# Patient Record
Sex: FEMALE | Race: WHITE | Employment: UNEMPLOYED | URBAN - METROPOLITAN AREA
[De-identification: names, ages, dates, MRNs, and addresses within clinical notes are randomized per-mention and may not be internally consistent; named-entity substitution may affect disease eponyms.]

---

## 2018-03-21 ENCOUNTER — OFFICE VISIT (OUTPATIENT)
Dept: NEUROLOGY | Age: 38
End: 2018-03-21

## 2018-03-21 VITALS
HEIGHT: 62 IN | SYSTOLIC BLOOD PRESSURE: 110 MMHG | BODY MASS INDEX: 36.99 KG/M2 | DIASTOLIC BLOOD PRESSURE: 84 MMHG | WEIGHT: 201 LBS

## 2018-03-21 DIAGNOSIS — R41.3 MEMORY DIFFICULTY: ICD-10-CM

## 2018-03-21 DIAGNOSIS — G43.719 INTRACTABLE CHRONIC MIGRAINE WITHOUT AURA AND WITHOUT STATUS MIGRAINOSUS: ICD-10-CM

## 2018-03-21 DIAGNOSIS — G44.40 MEDICATION OVERUSE HEADACHE: Primary | ICD-10-CM

## 2018-03-21 DIAGNOSIS — G44.221 CHRONIC TENSION-TYPE HEADACHE, INTRACTABLE: ICD-10-CM

## 2018-03-21 RX ORDER — BUPRENORPHINE AND NALOXONE 8; 2 MG/1; MG/1
FILM, SOLUBLE BUCCAL; SUBLINGUAL DAILY
COMMUNITY

## 2018-03-21 RX ORDER — TOPIRAMATE 50 MG/1
TABLET, FILM COATED ORAL
Qty: 60 TAB | Refills: 1 | Status: SHIPPED | OUTPATIENT
Start: 2018-03-21

## 2018-03-21 RX ORDER — SUMATRIPTAN 100 MG/1
TABLET, FILM COATED ORAL
Qty: 9 TAB | Refills: 1 | Status: SHIPPED | OUTPATIENT
Start: 2018-03-21

## 2018-03-21 RX ORDER — PREDNISONE 10 MG/1
TABLET ORAL
Qty: 24 TAB | Refills: 0 | Status: SHIPPED | OUTPATIENT
Start: 2018-03-21 | End: 2022-10-12

## 2018-03-21 RX ORDER — DULOXETIN HYDROCHLORIDE 30 MG/1
30 CAPSULE, DELAYED RELEASE ORAL DAILY
COMMUNITY
End: 2022-10-12

## 2018-03-21 RX ORDER — DEXTROAMPHETAMINE SACCHARATE, AMPHETAMINE ASPARTATE, DEXTROAMPHETAMINE SULFATE AND AMPHETAMINE SULFATE 5; 5; 5; 5 MG/1; MG/1; MG/1; MG/1
20 TABLET ORAL
COMMUNITY

## 2018-03-21 RX ORDER — BUPROPION HYDROCHLORIDE 150 MG/1
TABLET, EXTENDED RELEASE ORAL 2 TIMES DAILY
COMMUNITY
End: 2022-10-12

## 2018-03-21 NOTE — PROGRESS NOTES
Patient is here for memory loss and migraines. Memory loss started approximately 3 months ago. Migraines started after she had her daughter 12/2014, She saw Dr. Macho Obrien while at [facility]. He did a CT scan and told her she had air in her frontal lobe. Patient is accompanied by her mother and her sister.

## 2018-03-21 NOTE — PATIENT INSTRUCTIONS
[facility]   Statement to Patients  April 1, 2014      In an effort to ensure the large volume of patient prescription refills is processed in the most efficient and expeditious manner, we are asking our patients to assist us by calling your Pharmacy for all prescription refills, this will include also your  Mail Order Pharmacy. The pharmacy will contact our office electronically to continue the refill process. Please do not wait until the last minute to call your pharmacy. We need at least 48 hours (2days) to fill prescriptions. We also encourage you to call your pharmacy before going to  your prescription to make sure it is ready. With regard to controlled substance prescription refill requests (narcotic refills) that need to be picked up at our office, we ask your cooperation by providing us with at least 72 hours (3days) notice that you will need a refill. We will not refill narcotic prescription refill requests after 4:00pm on any weekday, Monday through Thursday, or after 2:00pm on Fridays, or on the weekends. We encourage everyone to explore another way of getting your prescription refill request processed using [employer], our patient web portal through our electronic medical record system. [employer] is an efficient and effective way to communicate your medication request directly to the office and  downloadable as an eliza on your smart phone . [employer] also features a review functionality that allows you to view your medication list as well as leave messages for your physician. Are you ready to get connected? If so please review the attatched instructions or speak to any of our staff to get you set up right away! Thank you so much for your cooperation. Should you have any questions please contact our Practice Administrator.     The Physicians and Staff,  [facility] ([place]

## 2018-03-21 NOTE — MR AVS SNAPSHOT
[facility] 
 
 
 [place] [address] [facility] [place] [address] [place] [address] [phone] Patient: Kat Estrella MRN: [id number] Visit Information Date & Time Provider Department Dept. Phone Encounter #  
 3/21/2018 11:00 AM Joel Seen, MD [facility] [phone] [id number] Your Appointments 4/20/2018 10:00 AM  
Follow Up with Joel Seen, MD  
[facility]) Appt Note: follow up MRI results [facility] [place] [address] [facility] [place] [address] [place] [address] [phone]  
  
   
 [place] [address] [id number] [address] Upcoming Health Maintenance Date Due DTaP/Tdap/Td series (1 - Tdap) 6/9/2001 PAP AKA CERVICAL CYTOLOGY 6/9/2001 Influenza Age 5 to Adult 8/1/2017 Allergies as of 3/21/2018  Review Complete On: 3/21/2018 By: Mickey Webster Severity Noted Reaction Type Reactions Toradol [Ketorolac]  03/21/2018    Rash Current Immunizations  Never Reviewed No immunizations on file. Not reviewed this visit You Were Diagnosed With   
  
 Codes Comments Medication overuse headache    -  Primary ICD-10-CM: G44.40 ICD-9-CM: 339. 3 Chronic tension-type headache, intractable     ICD-10-CM: A95.939 ICD-9-CM: 339.12 Intractable chronic migraine without aura and without status migrainosus     ICD-10-CM: B21.908 ICD-9-CM: 346.71 Memory difficulty     ICD-10-CM: R41.3 ICD-9-CM: 780.93 Vitals BP Height(growth percentile) Weight(growth percentile) BMI Smoking Status 110/84 5' 2\" (1.575 m) 201 lb (91.2 kg) 36.76 kg/m2 Current Every Day Smoker BMI and BSA Data Body Mass Index Body Surface Area  
 36.76 kg/m 2 2 m 2 Preferred Pharmacy Pharmacy Name Phone  [id number] [address]  [place] [facility] [phone] Your Updated Medication List  
  
   
This list is accurate as of 3/21/18 11:56 AM.  Always use your most recent med list.  
  
  
  
  
 ADDERALL 20 mg tablet Generic drug:  dextroamphetamine-amphetamine Take 20 mg by mouth. CYMBALTA 30 mg capsule Generic drug:  DULoxetine Take 30 mg by mouth daily. predniSONE 10 mg tablet Commonly known as:  Lowell Rinks Take in AM with food. Day 1: 6 tabs  Day 2: 6 tabs   Day 3: 5 tabs   Day 4: 4 tabs   Day 5: 3 tabs  Day 6: 1 tab. Then stop SUBOXONE 8-2 mg Film sublingaul film Generic drug:  buprenorphine-naloxone  
by SubLINGual route daily. SUMAtriptan 100 mg tablet Commonly known as:  IMITREX  
1 tab at onset of migraine. Repeat 1 tab in 2 hours if headache remains. Limit: max 2 days a week. topiramate 50 mg tablet Commonly known as:  TOPAMAX Take 1 tab twice a day for migraine prevention WELLBUTRIN  mg SR tablet Generic drug:  buPROPion SR Take  by mouth two (2) times a day. Prescriptions Sent to Pharmacy Refills  
 topiramate (TOPAMAX) 50 mg tablet 1 Sig: Take 1 tab twice a day for migraine prevention Class: Normal  
 Pharmacy: [facility]/pharmacy [address] Ph #: [phone] SUMAtriptan (IMITREX) 100 mg tablet 1 Si tab at onset of migraine. Repeat 1 tab in 2 hours if headache remains. Limit: max 2 days a week. Class: Normal  
 Pharmacy: [facility]/pharmacy #[id number] Ph #: [phone]  
 predniSONE (DELTASONE) 10 mg tablet 0 Sig: Take in AM with food. Day 1: 6 tabs  Day 2: 6 tabs   Day 3: 5 tabs   Day 4: 4 tabs   Day 5: 3 tabs  Day 6: 1 tab. Then stop  Class: Normal  
 Pharmacy: [address] [place] [address] [address] [facility] #: [phone] To-Do List   
 03/21/2018 Imaging:  MRI BRAIN W WO CONT Patient Instructions PRESCRIPTION REFILL POLICY [facility] Statement to Patients April 1, 2014 In an effort to ensure the large volume of patient prescription refills is processed in the most efficient and expeditious manner, we are asking our patients to assist us by calling your Pharmacy for all prescription refills, this will include also your  Mail Order Pharmacy. The pharmacy will contact our office electronically to continue the refill process. Please do not wait until the last minute to call your pharmacy. We need at least 48 hours (2days) to fill prescriptions. We also encourage you to call your pharmacy before going to  your prescription to make sure it is ready. With regard to controlled substance prescription refill requests (narcotic refills) that need to be picked up at our office, we ask your cooperation by providing us with at least 72 hours (3days) notice that you will need a refill. We will not refill narcotic prescription refill requests after 4:00pm on any weekday, Monday through Thursday, or after 2:00pm on Fridays, or on the weekends. We encourage everyone to explore another way of getting your prescription refill request processed using [employer], our patient web portal through our electronic medical record system. [employer] is an efficient and effective way to communicate your medication request directly to the office and  downloadable as an eliza on your smart phone . [employer] also features a review functionality that allows you to view your medication list as well as leave messages for your physician. Are you ready to get connected? If so please review the attatched instructions or speak to any of our staff to get you set up right away! Thank you so much for your cooperation.  Should you have any questions please contact our Practice Administrator. The Physicians and Staff,  [facility] Introducing [facility]! [facility] introduces [employer] patient portal. Now you can access parts of your medical record, email your doctor's office, and request medication refills online. 1. In your internet browser, go to [email or web address] 2. Click on the First Time User? Click Here link in the Sign In box. You will see the New Member Sign Up page. 3. Enter your Cogo Access Code exactly as it appears below. You will not need to use this code after youve completed the sign-up process. If you do not sign up before the expiration date, you must request a new code. · Cogo Access Code: [email or web address] Expires: 6/19/2018 11:56 AM 
 
4. Enter the last four digits of your Social Security Number (xxxx) and Date of Birth (mm/dd/yyyy) as indicated and click Submit. You will be taken to the next sign-up page. 5. Create a Cogo ID. This will be your Cogo login ID and cannot be changed, so think of one that is secure and easy to remember. 6. Create a Cogo password. You can change your password at any time. 7. Enter your Password Reset Question and Answer. This can be used at a later time if you forget your password. 8. Enter your e-mail address. You will receive e-mail notification when new information is available in [address] 9. Click Sign Up. You can now view and download portions of your medical record. 10. Click the Download Summary menu link to download a portable copy of your medical information. If you have questions, please visit the Frequently Asked Questions section of the Cogo website. Remember, Cogo is NOT to be used for urgent needs. For medical emergencies, dial 911. Now available from your iPhone and Android! Please provide this summary of care documentation to your next provider. Your primary care clinician is listed as KASSIDY TAVERAS. If you have any questions after today's visit, please call [phone].

## 2018-03-21 NOTE — PROGRESS NOTES
Name: Dorothy Gottlieb      :  1980    PCP:   Bouchra Albarran MD      Referring:  Self  MRN:   [id number]    Chief Complaint:   Chief Complaint   Patient presents with    Headache    Memory Loss       HISTORY OF PRESENT ILLNESS:     This is a 40 y.o. female depression, anxiety, substance addiction (on Suboxone) who presents for evaluation of headaches and memory issues. She's accompanied by sister and mother who helps with history. Started to have episodic migraine around 25years old. Describes migraine as: whole top of head, throbbing, feels like head might explode, + nausea, no vomiting, + light and sound sensitivity. Vision gets blurry before and during headache. Takes either Advil, BC powder, Tylenol, or Aleve every day to help reduce prevent headache. Up until 4 months ago, she reports having migraine 0-1 days a month. Reports having increased stressors since 2017 which roughly fits the 4-5 month timeline when headaches increased. Never been on a daily headache preventive. Currently on Wellbutrin, Cymbalta, and Adderall for mood and attention. She reports that in Dec 2014 had an epidural for childbirth, had a spinal leak, underwent epidural blood patch. Was seen by Neurology at the time (Dr Manuel Farrell), noted to have some air in frontal area, suspected to be from the epidural or blood patch. Pt says was told that it would absorb on its own. # of headache days a week: 7  # of migraine days a week: 6-7    Separate complaint is of memory difficulty. She says can't remember her age at times, losing stuff frequently, forgetting kids birthdays. She and family report that her depression and anxiety is \"extremely severe,\" worsening over past few months.         Complete Review of Systems: + anxiety, depression, fatigue, headaches, hearing loss, joint pain, leg swelling, memory loss, muscle pain, muscle weakness, nausea, decreased appetite, tinnitus, dyspnea, skipped beats, vertigo, visual disturbance, weight changes; Allergies   Allergen Reactions    Toradol [Ketorolac] Rash     Past Medical History:   Diagnosis Date    Anxiety     Arrhythmia     Depression     Headache      Current Outpatient Prescriptions   Medication Sig Dispense Refill    buPROPion SR (WELLBUTRIN SR) 150 mg SR tablet Take  by mouth two (2) times a day.  DULoxetine (CYMBALTA) 30 mg capsule Take 30 mg by mouth daily.  dextroamphetamine-amphetamine (ADDERALL) 20 mg tablet Take 20 mg by mouth.  buprenorphine-naloxone (SUBOXONE) 8-2 mg film sublingaul film by SubLINGual route daily.  topiramate (TOPAMAX) 50 mg tablet Take 1 tab twice a day for migraine prevention 60 Tab 1    SUMAtriptan (IMITREX) 100 mg tablet 1 tab at onset of migraine. Repeat 1 tab in 2 hours if headache remains. Limit: max 2 days a week. 9 Tab 1    predniSONE (DELTASONE) 10 mg tablet Take in AM with food. Day 1: 6 tabs  Day 2: 6 tabs   Day 3: 5 tabs   Day 4: 4 tabs   Day 5: 3 tabs  Day 6: 1 tab. Then stop 24 Tab 0     No past surgical history on file. Family History   Problem Relation Age of Onset    Heart Disease Mother     Heart Disease Father     Headache Sister      Social History     Social History    Marital status:      Spouse name: N/A    Number of children: N/A    Years of education: N/A     Occupational History    Not on file. Social History Main Topics    Smoking status: Current Every Day Smoker     Packs/day: 1.00     Years: 5.00    Smokeless tobacco: Never Used    Alcohol use No    Drug use: Not on file    Sexual activity: Not on file     Other Topics Concern    Not on file     Social History Narrative    No narrative on file       PHYSICAL EXAM  Vitals:    03/21/18 1109   BP: 110/84   Weight: 91.2 kg (201 lb)   Height: 5' 2\" (1.575 m)       General:  Alert, cooperative, NAD   Head:  Normocephalic, atraumatic.    Eyes:  Conjunctivae/corneas clear   Lungs:  Heart:  Non labored breathing  Regular rate, rhythm   Extremities: No edema. Skin: No rashes    Neurologic Exam       Language: normal  Memory:  Alert, oriented to person, place, situation    Cranial Nerves:  I: smell Not tested   II: visual fields Full to confrontation   II: pupils Equal, round, reactive to light   II: optic disc No papilledema   III,VII: ptosis none   III,IV,VI: extraocular muscles  normal   V: facial light touch sensation  normal   VII: facial muscle function  symmetric   VIII: hearing symmetric   IX: soft palate elevation  normal   XI: sternocleidomastoid strength 5/5   XII: tongue  midline      Motor: normal bulk, tone, strength in all exts  Sensory: intact to LT, PP, temp, vibration x 4 exts   Cerebellar: no rest, postural, or intention tremor  Normal FNF and H-Shin bilaterally  Reflexes: 2+ throughout  Plantar response: neutral bilaterally    Gait: normal gait including tandem  Romberg negative     No outside clinic notes/ imaging reports available for review    ASSESSMENT AND PLAN    ICD-10-CM ICD-9-CM    1. Medication overuse headache G44.40 339.3 predniSONE (DELTASONE) 10 mg tablet   2. Chronic tension-type headache, intractable G44.221 339.12 topiramate (TOPAMAX) 50 mg tablet   3. Intractable chronic migraine without aura and without status migrainosus G43.719 346.71 MRI BRAIN W WO CONT      topiramate (TOPAMAX) 50 mg tablet      SUMAtriptan (IMITREX) 100 mg tablet      predniSONE (DELTASONE) 10 mg tablet   4. Memory difficulty R41.3 780.93 MRI BRAIN W WO CONT       Discussed with patient/ family that excessive use of headache pain relievers will cause medication overuse/ rebound headache, complicating headache management. Advised pt to stop all OTC headache pain relievers. Given Rx for prednisone taper over course of 6 days to help alleviate any withdrawal headache. Pt was agreeable to starting a daily headache preventive to reduce headache frequency.  Given Rx for Topamax 50 mg BID (first week 1 tab QHS, then one tab BID thereafter). For acute migraine treatment, she was given Rx Imitrex 100 mg tab to use up to 2 days in a week. Discussed with her that the memory difficulty is most likely due to worsening anxiety/ depression that patient/ family have identified. Will check MRI Brain +/- contrast to follow up on prior findings () at [facility] of air embolism after pregnancy epidural and subsequent epidural blood patch. Follow up in 4 weeks to reassess headache status.        Signed By: Liss Cormier MD     March 21, 2018

## 2018-03-27 ENCOUNTER — HOSPITAL ENCOUNTER (OUTPATIENT)
Dept: GENERAL RADIOLOGY | Age: 38
Discharge: HOME | End: 2018-03-27
Attending: INTERNAL MEDICINE
Payer: MEDICAID

## 2018-03-27 DIAGNOSIS — M25.562 KNEE PAIN, LEFT: ICD-10-CM

## 2018-03-27 DIAGNOSIS — M25.561 KNEE PAIN, RIGHT: ICD-10-CM

## 2018-03-27 DIAGNOSIS — M54.2 NECK PAIN: ICD-10-CM

## 2018-03-27 PROCEDURE — 73562 X-RAY EXAM OF KNEE 3: CPT

## 2018-03-27 PROCEDURE — 72050 X-RAY EXAM NECK SPINE 4/5VWS: CPT

## 2018-03-29 ENCOUNTER — TELEPHONE (OUTPATIENT)
Dept: NEUROLOGY | Age: 38
End: 2018-03-29

## 2018-03-29 NOTE — TELEPHONE ENCOUNTER
----- Message from Lj Esteves sent at 3/29/2018  2:16 PM EDT -----  Regarding: /Telephone  Lety Matt pt's sister called to scheduled MRI appt for the pt. Sister best contact number is [phone].

## 2018-03-30 NOTE — TELEPHONE ENCOUNTER
Contacted Inés back. Informed her STEFANI has called and left  to schedule MRI. Inés was very angry and stated \"No one has called me and I have called the # you provided at the office visit and no one will return my call\". Informed her the office cannot schedule imaging however nurse will call Merly Rangel and provide them the alternative phone # for [employer]. Contacted [facility] and left  requesting they contact sister Inés to schedule MRI, provided her phone # on vm.

## 2018-04-05 ENCOUNTER — HOSPITAL ENCOUNTER (OUTPATIENT)
Dept: MRI IMAGING | Age: 38
Discharge: HOME | End: 2018-04-05
Attending: PSYCHIATRY & NEUROLOGY
Payer: MEDICAID

## 2018-04-05 DIAGNOSIS — G43.719 INTRACTABLE CHRONIC MIGRAINE WITHOUT AURA AND WITHOUT STATUS MIGRAINOSUS: ICD-10-CM

## 2018-04-05 DIAGNOSIS — R41.3 MEMORY DIFFICULTY: ICD-10-CM

## 2018-04-05 PROCEDURE — 70553 MRI BRAIN STEM W/O & W/DYE: CPT

## 2018-04-05 PROCEDURE — 74011250636 HC RX REV CODE- 250/636: Performed by: PSYCHIATRY & NEUROLOGY

## 2018-04-05 PROCEDURE — A9576 INJ PROHANCE MULTIPACK: HCPCS | Performed by: PSYCHIATRY & NEUROLOGY

## 2018-04-05 RX ADMIN — GADOTERIDOL 19 ML: 279.3 INJECTION, SOLUTION INTRAVENOUS at 12:30

## 2018-06-21 ENCOUNTER — TELEPHONE (OUTPATIENT)
Dept: NEUROLOGY | Age: 38
End: 2018-06-21

## 2018-06-21 NOTE — TELEPHONE ENCOUNTER
----- Message from Lindy Whisper sent at 6/21/2018 11:42 AM EDT -----  Regarding: Dr. Shruthi Gray  Pt is calling for a appointment because she needs a CT Scan done and she would like a call back. Pt best contact number [phone]. Pt states she is in a lot of pain and really needs to speak with the Dr. Lilibeth Price nurse. Best contact [phone]. Thanks.

## 2019-02-18 ENCOUNTER — APPOINTMENT (OUTPATIENT)
Dept: CT IMAGING | Age: 39
End: 2019-02-18
Attending: STUDENT IN AN ORGANIZED HEALTH CARE EDUCATION/TRAINING PROGRAM
Payer: MEDICAID

## 2019-02-18 ENCOUNTER — HOSPITAL ENCOUNTER (EMERGENCY)
Age: 39
Discharge: HOME | End: 2019-02-18
Attending: STUDENT IN AN ORGANIZED HEALTH CARE EDUCATION/TRAINING PROGRAM
Payer: MEDICAID

## 2019-02-18 VITALS
DIASTOLIC BLOOD PRESSURE: 88 MMHG | HEART RATE: 84 BPM | HEIGHT: 62 IN | RESPIRATION RATE: 24 BRPM | OXYGEN SATURATION: 98 % | WEIGHT: 205.03 LBS | TEMPERATURE: 98.2 F | SYSTOLIC BLOOD PRESSURE: 158 MMHG | BODY MASS INDEX: 37.73 KG/M2

## 2019-02-18 DIAGNOSIS — G43.809 OTHER MIGRAINE WITHOUT STATUS MIGRAINOSUS, NOT INTRACTABLE: Primary | ICD-10-CM

## 2019-02-18 PROCEDURE — 74011250637 HC RX REV CODE- 250/637: Performed by: STUDENT IN AN ORGANIZED HEALTH CARE EDUCATION/TRAINING PROGRAM

## 2019-02-18 PROCEDURE — 96374 THER/PROPH/DIAG INJ IV PUSH: CPT

## 2019-02-18 PROCEDURE — 99283 EMERGENCY DEPT VISIT LOW MDM: CPT

## 2019-02-18 PROCEDURE — 70450 CT HEAD/BRAIN W/O DYE: CPT

## 2019-02-18 PROCEDURE — 96375 TX/PRO/DX INJ NEW DRUG ADDON: CPT

## 2019-02-18 PROCEDURE — 74011250636 HC RX REV CODE- 250/636: Performed by: STUDENT IN AN ORGANIZED HEALTH CARE EDUCATION/TRAINING PROGRAM

## 2019-02-18 RX ORDER — DIHYDROERGOTAMINE MESYLATE 1 MG/ML
0.5 INJECTION, SOLUTION INTRAMUSCULAR; INTRAVENOUS; SUBCUTANEOUS ONCE
Status: COMPLETED | OUTPATIENT
Start: 2019-02-18 | End: 2019-02-18

## 2019-02-18 RX ORDER — DEXAMETHASONE SODIUM PHOSPHATE 10 MG/ML
10 INJECTION INTRAMUSCULAR; INTRAVENOUS ONCE
Status: COMPLETED | OUTPATIENT
Start: 2019-02-18 | End: 2019-02-18

## 2019-02-18 RX ORDER — METOCLOPRAMIDE HYDROCHLORIDE 5 MG/ML
10 INJECTION INTRAMUSCULAR; INTRAVENOUS
Status: COMPLETED | OUTPATIENT
Start: 2019-02-18 | End: 2019-02-18

## 2019-02-18 RX ORDER — HYDROXYZINE 25 MG/1
25 TABLET, FILM COATED ORAL
Status: COMPLETED | OUTPATIENT
Start: 2019-02-18 | End: 2019-02-18

## 2019-02-18 RX ADMIN — DEXAMETHASONE SODIUM PHOSPHATE 10 MG: 10 INJECTION, SOLUTION INTRAMUSCULAR; INTRAVENOUS at 18:59

## 2019-02-18 RX ADMIN — HYDROXYZINE HYDROCHLORIDE 25 MG: 25 TABLET, FILM COATED ORAL at 18:59

## 2019-02-18 RX ADMIN — METOCLOPRAMIDE 10 MG: 5 INJECTION, SOLUTION INTRAMUSCULAR; INTRAVENOUS at 18:59

## 2019-02-18 RX ADMIN — DIHYDROERGOTAMINE MESYLATE 0.5 MG: 1 INJECTION INTRAMUSCULAR; INTRAVENOUS; SUBCUTANEOUS at 20:46

## 2019-02-18 NOTE — ED TRIAGE NOTES
Hx of TMJ, right sided jaw pain not relieved by OTC meds and ice. Describes pain as colicky and is associated with headache and facial dysthesias

## 2019-02-18 NOTE — ED PROVIDER NOTES
The history is provided by the patient. Migraine This is a new problem. The current episode started 12 to 24 hours ago. The problem occurs constantly (occurs every few months). The problem has been gradually worsening. The headache is aggravated by an unknown factor. The pain is located in the right unilateral region. The quality of the pain is described as throbbing (\"it feels like my head is about to explode\" - appears very similar to descirption from neurology note from 3/2018). The pain is severe. Associated symptoms include visual change (blurry vision) and nausea. Pertinent negatives include no fever, no shortness of breath and no vomiting. She has tried NSAIDs, darkened room and acetaminophen for the symptoms. The treatment provided no relief. Past Medical History:  
Diagnosis Date  Anxiety  Arrhythmia  Depression  Headache No past surgical history on file. Family History:  
Problem Relation Age of Onset  Heart Disease Mother  Heart Disease Father  Headache Sister Social History Socioeconomic History  Marital status:  Spouse name: Not on file  Number of children: Not on file  Years of education: Not on file  Highest education level: Not on file Social Needs  Financial resource strain: Not on file  Food insecurity - worry: Not on file  Food insecurity - inability: Not on file  Transportation needs - medical: Not on file  Transportation needs - non-medical: Not on file Occupational History  Not on file Tobacco Use  Smoking status: Current Every Day Smoker Packs/day: 1.00 Years: 5.00 Pack years: 5.00  Smokeless tobacco: Never Used Substance and Sexual Activity  Alcohol use: No  
 Drug use: Not on file  Sexual activity: Not on file Other Topics Concern  Not on file Social History Narrative  Not on file ALLERGIES: Toradol [ketorolac] Review of Systems Constitutional: Negative for chills and fever. HENT:  
     Jaw pain Respiratory: Negative for cough and shortness of breath. Cardiovascular: Negative for chest pain. Gastrointestinal: Positive for nausea. Negative for abdominal pain and vomiting. Genitourinary: Negative for dysuria. Musculoskeletal: Negative for back pain. Skin: Negative for rash. Neurological: Positive for headaches (R sided). Negative for syncope. All other systems reviewed and are negative. Vitals:  
 02/18/19 1745 02/18/19 1837 BP:  158/88 Pulse: 88 84 Resp:  24 Temp:  98.2 °F (36.8 °C) SpO2: 97% 98% Weight:  93 kg (205 lb 0.4 oz) Height:  5' 2\" (1.575 m) Physical Exam  
Constitutional: She is oriented to person, place, and time. She appears well-developed. hysterical  
HENT:  
Head: Normocephalic and atraumatic. Eyes: Conjunctivae and EOM are normal. Pupils are equal, round, and reactive to light. Neck: Normal range of motion. Neck supple. Cardiovascular: Normal rate, regular rhythm and normal heart sounds. Pulmonary/Chest: Effort normal and breath sounds normal. No respiratory distress. Abdominal: Soft. There is no tenderness. There is no guarding. Musculoskeletal: Normal range of motion. She exhibits no edema. Neurological: She is alert and oriented to person, place, and time. She has normal strength. No cranial nerve deficit or sensory deficit. She exhibits normal muscle tone. Gait normal.  
Skin: Skin is warm and dry. MDM Procedures A/P: 44 yo F with PMH of migraine headaches, here with R unilateral headache. Will treat as migraine, CT head to screen for other pathology. 8:35 PM 
Pt feeling better but still feels pain in jaw. Reviewed CT results and need to follow up with neurology. Will try DHE. Pt with h/o opioid dependence so reluctant to given narcotics. [address] stabilized today.

## 2019-02-19 NOTE — ED NOTES
MD reviewed discharge instructions and options with patient and patient verbalized understanding. RN reviewed discharge instructions using teachback method. Pt ambulated to exit without difficulty and in no signs of acute distress escorted by mother who will drive home. No complaints or needs expressed at this time. VSS at time of discharge. Pt to call PCP in the morning for follow up.

## 2019-02-19 NOTE — DISCHARGE INSTRUCTIONS
Patient Education        Migraine Headache: Care Instructions  Your Care Instructions  Migraines are painful, throbbing headaches that often start on one side of the head. They may cause nausea and vomiting and make you sensitive to light, sound, or smell. Without treatment, migraines can last from 4 hours to a few days. Medicines can help prevent migraines or stop them after they have started. Your doctor can help you find which ones work best for you. Follow-up care is a key part of your treatment and safety. Be sure to make and go to all appointments, and call your doctor if you are having problems. It's also a good idea to know your test results and keep a list of the medicines you take. How can you care for yourself at home? · Do not drive if you have taken a prescription pain medicine. · Rest in a quiet, dark room until your headache is gone. Close your eyes, and try to relax or go to sleep. Don't watch TV or read. · Put a cold, moist cloth or cold pack on the painful area for 10 to 20 minutes at a time. Put a thin cloth between the cold pack and your skin. · Use a warm, moist towel or a heating pad set on low to relax tight shoulder and neck muscles. · Have someone gently massage your neck and shoulders. · Take your medicines exactly as prescribed. Call your doctor if you think you are having a problem with your medicine. You will get more details on the specific medicines your doctor prescribes. · Be careful not to take pain medicine more often than the instructions allow. You could get worse or more frequent headaches when the medicine wears off. To prevent migraines  · Keep a headache diary so you can figure out what triggers your headaches. Avoiding triggers may help you prevent headaches. Record when each headache began, how long it lasted, and what the pain was like.  (Was it throbbing, aching, stabbing, or dull?) Write down any other symptoms you had with the headache, such as nausea, flashing lights or dark spots, or sensitivity to bright light or loud noise. Note if the headache occurred near your period. List anything that might have triggered the headache. Triggers may include certain foods (chocolate, cheese, wine) or odors, smoke, bright light, stress, or lack of sleep. · If your doctor has prescribed medicine for your migraines, take it as directed. You may have medicine that you take only when you get a migraine and medicine that you take all the time to help prevent migraines. ? If your doctor has prescribed medicine for when you get a headache, take it at the first sign of a migraine, unless your doctor has given you other instructions. ? If your doctor has prescribed medicine to prevent migraines, take it exactly as prescribed. Call your doctor if you think you are having a problem with your medicine. · Find healthy ways to deal with stress. Migraines are most common during or right after stressful times. Take time to relax before and after you do something that has caused a migraine in the past.  · Try to keep your muscles relaxed by keeping good posture. Check your jaw, face, neck, and shoulder muscles for tension. Try to relax them. When you sit at a desk, change positions often. And make sure to stretch for 30 seconds each hour. · Get plenty of sleep and exercise. · Eat meals on a regular schedule. Avoid foods and drinks that often trigger migraines. These include chocolate, alcohol (especially red wine and port), aspartame, monosodium glutamate (MSG), and some additives found in foods (such as hot dogs, miller, cold cuts, aged cheeses, and pickled foods). · Limit caffeine. Don't drink too much coffee, tea, or soda. But don't quit caffeine suddenly. That can also give you migraines. · Do not smoke or allow others to smoke around you. If you need help quitting, talk to your doctor about stop-smoking programs and medicines.  These can increase your chances of quitting for good.  · If you are taking birth control pills or hormone therapy, talk to your doctor about whether they are triggering your migraines. When should you call for help? Call 911 anytime you think you may need emergency care. For example, call if:    · You have signs of a stroke. These may include:  ? Sudden numbness, paralysis, or weakness in your face, arm, or leg, especially on only one side of your body. ? Sudden vision changes. ? Sudden trouble speaking. ? Sudden confusion or trouble understanding simple statements. ? Sudden problems with walking or balance. ? A sudden, severe headache that is different from past headaches.    Call your doctor now or seek immediate medical care if:    · You have new or worse nausea and vomiting.     · You have a new or higher fever.     · Your headache gets much worse.    Watch closely for changes in your health, and be sure to contact your doctor if:    · You are not getting better after 2 days (48 hours). Where can you learn more? Go to http://georgina-mary beth.info/. Enter Z260 in the search box to learn more about \"Migraine Headache: Care Instructions. \"  Current as of: Aleyda 3, 2018  Content Version: 11.9  © 2174-5094 [employer]. Care instructions adapted under license by [employer] (which disclaims liability or warranty for this information). If you have questions about a medical condition or this instruction, always ask your healthcare professional. [place] [address] any warranty or liability for your use of this information.

## 2022-10-12 ENCOUNTER — OFFICE VISIT (OUTPATIENT)
Dept: BEHAVIORAL/MENTAL HEALTH CLINIC | Age: 42
End: 2022-10-12
Payer: MEDICAID

## 2022-10-12 VITALS — HEART RATE: 70 BPM | DIASTOLIC BLOOD PRESSURE: 87 MMHG | SYSTOLIC BLOOD PRESSURE: 127 MMHG | OXYGEN SATURATION: 98 %

## 2022-10-12 DIAGNOSIS — F90.2 ADHD (ATTENTION DEFICIT HYPERACTIVITY DISORDER), COMBINED TYPE: ICD-10-CM

## 2022-10-12 DIAGNOSIS — F41.9 ANXIETY: ICD-10-CM

## 2022-10-12 DIAGNOSIS — F19.90 SUBSTANCE USE DISORDER: ICD-10-CM

## 2022-10-12 DIAGNOSIS — F31.62 BIPOLAR DISORDER, CURRENT EPISODE MIXED, MODERATE (HCC): Primary | ICD-10-CM

## 2022-10-12 PROCEDURE — 99204 OFFICE O/P NEW MOD 45 MIN: CPT | Performed by: NURSE PRACTITIONER

## 2022-10-12 RX ORDER — ARIPIPRAZOLE 2 MG/1
2 TABLET ORAL DAILY
Qty: 30 TABLET | Refills: 1 | Status: SHIPPED | OUTPATIENT
Start: 2022-10-12

## 2022-10-12 RX ORDER — HYDROXYZINE PAMOATE 50 MG/1
50 CAPSULE ORAL
Qty: 60 CAPSULE | Refills: 2 | Status: SHIPPED | OUTPATIENT
Start: 2022-10-12 | End: 2022-10-12 | Stop reason: SDUPTHER

## 2022-10-12 RX ORDER — HYDROXYZINE PAMOATE 50 MG/1
50 CAPSULE ORAL
Qty: 60 CAPSULE | Refills: 2 | Status: SHIPPED | OUTPATIENT
Start: 2022-10-12

## 2022-10-12 RX ORDER — ARIPIPRAZOLE 2 MG/1
2 TABLET ORAL DAILY
Qty: 30 TABLET | Refills: 1 | Status: SHIPPED | OUTPATIENT
Start: 2022-10-12 | End: 2022-10-12

## 2022-10-12 RX ORDER — SERTRALINE HYDROCHLORIDE 50 MG/1
50 TABLET, FILM COATED ORAL DAILY
Qty: 30 TABLET | Refills: 0 | Status: SHIPPED | OUTPATIENT
Start: 2022-10-12
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
## 2022-11-29 ENCOUNTER — OFFICE VISIT (OUTPATIENT)
Dept: BEHAVIORAL/MENTAL HEALTH CLINIC | Age: 42
End: 2022-11-29
Payer: MEDICAID

## 2022-11-29 VITALS
RESPIRATION RATE: 18 BRPM | TEMPERATURE: 97.1 F | DIASTOLIC BLOOD PRESSURE: 80 MMHG | HEIGHT: 62 IN | SYSTOLIC BLOOD PRESSURE: 119 MMHG | OXYGEN SATURATION: 98 % | HEART RATE: 76 BPM | WEIGHT: 219.8 LBS | BODY MASS INDEX: 40.45 KG/M2

## 2022-11-29 DIAGNOSIS — F41.9 ANXIETY: ICD-10-CM

## 2022-11-29 DIAGNOSIS — F31.62 BIPOLAR DISORDER, CURRENT EPISODE MIXED, MODERATE (HCC): Primary | ICD-10-CM

## 2022-11-29 DIAGNOSIS — F19.90 SUBSTANCE USE DISORDER: ICD-10-CM

## 2022-11-29 PROCEDURE — 99214 OFFICE O/P EST MOD 30 MIN: CPT | Performed by: NURSE PRACTITIONER

## 2022-11-29 RX ORDER — ARIPIPRAZOLE 2 MG/1
2 TABLET ORAL DAILY
Qty: 30 TABLET | Refills: 1 | Status: SHIPPED | OUTPATIENT
Start: 2022-11-29

## 2022-11-29 RX ORDER — HYDROXYZINE PAMOATE 25 MG/1
25 CAPSULE ORAL
Qty: 90 CAPSULE | Refills: 1 | Status: SHIPPED | OUTPATIENT
Start: 2022-11-29

## 2022-11-29 RX ORDER — SERTRALINE HYDROCHLORIDE 100 MG/1
100 TABLET, FILM COATED ORAL DAILY
Qty: 30 TABLET | Refills: 2 | Status: SHIPPED | OUTPATIENT
Start: 2022-11-29

## 2022-11-29 NOTE — PROGRESS NOTES
Chief Complaint   Patient presents with    Medication Management     6 week f/u       1. Have you been to the ER, urgent care clinic since your last visit? Hospitalized since your last visit? No    2. Have you seen or consulted any other health care providers outside of the [address] [place] [address] since your last visit? Include any pap smears or colon screening.  No    Visit Vitals  /80 (BP 1 Location: Left upper arm, BP Patient Position: Sitting)   Pulse 76   Temp 97.1 °F (36.2 °C) (Temporal)   Resp 18   Ht 5' 2\" (1.575 m)   Wt 99.7 kg (219 lb 12.8 oz)   SpO2 98%   BMI 40.20 kg/m²

## 2022-11-29 NOTE — PROGRESS NOTES
CHIEF COMPLAINT:  Kely Helms is a 43 y.o. female and was seen today for follow-up of psychiatric condition and psychotropic medication management. HPI:    Peyton reports the following psychiatric symptoms by hx:  depression, agitation, anxiety, and MACK . Overall symptoms have been present for years. Currently symptoms are  of moderate/high severity. The symptoms occur daily. Pt reports medications are beneficial. Met with pt for appt today to review current treatment plan. FAMILY/SOCIAL HX: psychosocial stressors, custody issues     REVIEW OF SYSTEMS:  Psychiatric symptoms being monitored for:  depression, BD, MACK   Appetite:increased   Sleep: decreased more than normal   Neuro: denies     Visit Vitals  /80 (BP 1 Location: Left upper arm, BP Patient Position: Sitting)   Pulse 76   Temp 97.1 °F (36.2 °C) (Temporal)   Resp 18   Ht 5' 2\" (1.575 m)   Wt 99.7 kg (219 lb 12.8 oz)   SpO2 98%   BMI 40.20 kg/m²       Side Effects:  none    MENTAL STATUS EXAM:   Sensorium  oriented to time, place and person   Relations cooperative   Appearance:  age appropriate and within normal Limits   Motor Behavior:  within normal limits   Speech:  normal pitch and normal volume   Thought Process: within normal limits   Thought Content free of delusions and free of hallucinations   Suicidal ideations none   Homicidal ideations none   Mood:  irritable   Affect:  normal   Memory recent  adequate   Memory remote:  adequate   Concentration:  impaired   Abstraction:  abstract   Insight:  good   Reliability good   Judgment:  good     MEDICAL DECISION MAKING:  Problems addressed today:    ICD-10-CM ICD-9-CM    1. Bipolar disorder, current episode mixed, moderate (HCC)  F31.62 296.62 ARIPiprazole (ABILIFY) 2 mg tablet      2. Anxiety  F41.9 300.00 hydrOXYzine pamoate (VISTARIL) 25 mg capsule      sertraline (ZOLOFT) 100 mg tablet      3.  Substance use disorder  F19.90 305.90           Assessment:   Peyton yasmine responding to treatment. Symptoms are improving, but she continues to struggle with depression, anxiety, and substance use. Patient reports she has been staying at her moms away from her apartment. She states she feels better than before, but she is struggling to maintain sobriety. She found 3 friends unresponsive in her apartment and had to use narcan. She is having some post traumatic stress. Discussed current medications and dosages. Will increase Zoloft and change Vistaril to 25 mg TID. Risks vs benefits discussed. Will provide information for [facility] for substance use treatment and send referral for the [address]. Reviewed treatment goals and target symptoms to monitor for. Will continue to monitor. Plan:   1. Current Outpatient Medications   Medication Sig Dispense Refill    hydrOXYzine pamoate (VISTARIL) 25 mg capsule Take 1 Capsule by mouth three (3) times daily as needed for Sleep or Anxiety. 90 Capsule 1    sertraline (ZOLOFT) 100 mg tablet Take 1 Tablet by mouth daily. Indications: anxiousness associated with depression 30 Tablet 2    ARIPiprazole (ABILIFY) 2 mg tablet Take 1 Tablet by mouth daily. 30 Tablet 1    buprenorphine-naloxone (SUBOXONE) 8-2 mg film sublingaul film by SubLINGual route daily. dextroamphetamine-amphetamine (ADDERALL) 20 mg tablet Take 20 mg by mouth. (Patient not taking: Reported on 11/29/2022)      topiramate (TOPAMAX) 50 mg tablet Take 1 tab twice a day for migraine prevention (Patient not taking: Reported on 11/29/2022) 60 Tab 1    SUMAtriptan (IMITREX) 100 mg tablet 1 tab at onset of migraine. Repeat 1 tab in 2 hours if headache remains. Limit: max 2 days a week. (Patient not taking: Reported on 11/29/2022) 9 Tab 1          medication changes made today: Vistaril 25 mg po TID     2. Counseling and coordination of care including instructions for treatment, risks/benefits, risk factor reduction and patient/family education. She agrees with the plan. Patient instructed to call with any side effects, questions or issues. 3.    Follow-up and Dispositions    Return in about 6 weeks (around 1/10/2023) for med/management .            11/29/2022  Anthony Fuller NP

## 2023-01-10 ENCOUNTER — OFFICE VISIT (OUTPATIENT)
Dept: OBGYN CLINIC | Age: 43
End: 2023-01-10
Payer: MEDICAID

## 2023-01-10 VITALS
BODY MASS INDEX: 41.7 KG/M2 | SYSTOLIC BLOOD PRESSURE: 128 MMHG | DIASTOLIC BLOOD PRESSURE: 80 MMHG | WEIGHT: 226.6 LBS | HEIGHT: 62 IN

## 2023-01-10 DIAGNOSIS — N64.4 MASTALGIA: ICD-10-CM

## 2023-01-10 DIAGNOSIS — N61.1 BREAST ABSCESS: Primary | ICD-10-CM

## 2023-01-10 PROBLEM — E66.01 MORBID OBESITY WITH BMI OF 40.0-44.9, ADULT (HCC): Status: ACTIVE | Noted: 2023-01-10

## 2023-01-10 PROCEDURE — 99214 OFFICE O/P EST MOD 30 MIN: CPT | Performed by: OBSTETRICS & GYNECOLOGY

## 2023-01-10 RX ORDER — CEPHALEXIN 500 MG/1
500 CAPSULE ORAL 3 TIMES DAILY
Qty: 21 CAPSULE | Refills: 0 | Status: SHIPPED | OUTPATIENT
Start: 2023-01-10 | End: 2023-01-17

## 2023-01-10 NOTE — PROGRESS NOTES
Breast Lump Evaluation    Zeke Ballard is a No obstetric history on file. ,  43 y.o. female  whose Patient's last menstrual period was 12/25/2022. .    She presents with breast pain on the left over the past few weeks getting way worse and now hurting down her arm. No nipple discharge. Does report feeling feverish and feels like breast is hot. Has not had a mammogram.  Denies family history of breast cancer. Previous History:  Past Medical History:   Diagnosis Date    Anxiety     Arrhythmia     Cervical high risk HPV (human papillomavirus) TYPE 18 2014    Depression     Ectopic pregnancy 10/2012    Endometriosis     Fibromyalgia     Genital herpes     Headache     Hypertension     PID (acute pelvic inflammatory disease)      Past Surgical History:   Procedure Laterality Date    HX GYN      HX LAP CHOLECYSTECTOMY  10/2009    HX PELVIC LAPAROSCOPY  10/2012    Ovarian Cyst + ablation endometriosis    HX SALPINECTOMY  Left 10/2012    ectopic    HX TUBAL LIGATION Bilateral 10/2015    scope     Social History     Occupational History    Not on file   Tobacco Use    Smoking status: Every Day     Packs/day: 0.50     Years: 5.00     Pack years: 2.50     Types: Cigarettes    Smokeless tobacco: Never   Vaping Use    Vaping Use: Former   Substance and Sexual Activity    Alcohol use: No    Drug use: Yes     Frequency: 7.0 times per week     Types: Cocaine, Opiates    Sexual activity: Yes     Partners: Male     Birth control/protection: Surgical     Family History   Problem Relation Age of Onset    Heart Disease Mother     Heart Disease Father     Headache Sister     Breast Cancer Neg Hx        No Known Allergies  Prior to Admission medications    Medication Sig Start Date End Date Taking? Authorizing Provider   cephALEXin (KEFLEX) 500 mg capsule Take 1 Capsule by mouth three (3) times daily for 7 days.  Indications: an infection of the skin and the tissue below the skin 1/10/23 1/17/23 Yes Reena Stone MD hydrOXYzine pamoate (VISTARIL) 25 mg capsule Take 1 Capsule by mouth three (3) times daily as needed for Sleep or Anxiety. 11/29/22  Yes Katarina Saeed NP   sertraline (ZOLOFT) 100 mg tablet Take 1 Tablet by mouth daily. Indications: anxiousness associated with depression 11/29/22  Yes Katarina Saeed NP   ARIPiprazole (ABILIFY) 2 mg tablet Take 1 Tablet by mouth daily. 11/29/22  Yes Darnell Holt NP        Review of Systems: History obtained from the patient  Constitutional: negative for weight loss, fever, night sweats  HEENT: negative for hearing loss, earache, congestion, snoring, sorethroat  CV: negative for chest pain, palpitations, edema  Resp: negative for cough, shortness of breath, wheezing  Breast: see above  GI: negative for change in bowel habits, abdominal pain, black or bloody stools  : negative for frequency, dysuria, hematuria, vaginal discharge  MSK: negative for back pain, joint pain, muscle pain  Skin: negative for itching, rash, hives  Neuro: negative for dizziness, headache, confusion, weakness  Psych: negative for anxiety, depression, change in mood  Heme/lymph: negative for bleeding, bruising, pallor        Objective:    Visit Vitals  /80   Ht 5' 2\" (1.575 m)   Wt 226 lb 9.6 oz (102.8 kg)   LMP 12/25/2022   BMI 41.45 kg/m²       Physical Exam:    Constitutional  Appearance: well-nourished, well developed, alert, in no acute distress    HENT  Head and Face: appears normal    Neck  Inspection/Palpation: normal appearance, no masses or tenderness  Lymph Nodes: no lymphadenopathy present  Thyroid: gland size normal, nontender, no nodules or masses present on palpation    Breasts  Inspection of Breasts: breasts symmetrical, no skin changes, no discharge present, nipple appearance normal, no skin retraction present  Palpation of Breasts and Axillae: a small fluctuant mass is present on palpation on the left upper areola around 12 oclock which is very tender.   The breast is hot with mild erythema. No obvious cellulitis  Axillary Lymph Nodes: no lymphadenopathy present    Skin  General Inspection: no rash, no lesions identified    Neurologic/Psychiatric  Mental Status:  Orientation: grossly oriented to person, place and time  Mood and Affect: mood normal, affect appropriate    Assessment:    ICD-10-CM ICD-9-CM    1. Breast abscess  N61.1 611.0 US BREAST LT COMPLETE 4 QUAD      2.  Mastalgia  N64.4 611.71 FOUZIA 3D AICHA W MAMMO BI DX INCL CAD      US BREAST LT COMPLETE 4 QUAD            Plan:  Keflex 500 TID  Return in about 1 month (around 2/10/2023) for Annual.    Total time including getting records from old system 30 min

## 2023-01-10 NOTE — PROGRESS NOTES
Dimitry Miller is a 43 y.o. female presents for a problem visit. Chief Complaint   Patient presents with    Breast pain     No LMP recorded. Birth Control: tubal ligation. Last Pap: normal obtained 6/23/2021. The patient is reporting having: Breast Pain left for 2  weeks. She reports the symptoms are has worsened. 1. Have you been to the ER, urgent care clinic, or hospitalized since your last visit? No    2. Have you seen or consulted any other health care providers outside of the [address] [place] [address] since your last visit?  No    Examination chaperoned by Jazz Moody MA.

## 2023-01-13 ENCOUNTER — TELEPHONE (OUTPATIENT)
Dept: OBGYN CLINIC | Age: 43
End: 2023-01-13

## 2023-01-13 NOTE — TELEPHONE ENCOUNTER
Mother calling about her daughter, and was advised that patient will need to call the office since the permission to release form is marked with and X      43year old patient last seen in the office on 1/10/2023        Mother calling about order that are to be placed    Orders are placed

## 2023-01-23 ENCOUNTER — TELEPHONE (OUTPATIENT)
Dept: OBGYN CLINIC | Age: 43
End: 2023-01-23

## 2023-01-23 DIAGNOSIS — N64.4 MASTALGIA: Primary | ICD-10-CM

## 2023-01-23 NOTE — TELEPHONE ENCOUNTER
43year old patient last seen in the office on 1/10/2023 and is calling to say that no one has called her to set up her additional views    This nurse provided the central scheduling number for patient to call and explained the orders are in the system and they will know how to schedule her . Patient verbalized understanding.

## 2023-01-23 NOTE — TELEPHONE ENCOUNTER
Central scheduling calling to say that the orders in the chart will  before they can  get her scheduled     Current orders discontinued and new orders placed as per Md order

## 2023-02-02 ENCOUNTER — HOSPITAL ENCOUNTER (OUTPATIENT)
Dept: MAMMOGRAPHY | Age: 43
Discharge: HOME | End: 2023-02-02
Attending: OBSTETRICS & GYNECOLOGY
Payer: MEDICAID

## 2023-02-02 ENCOUNTER — HOSPITAL ENCOUNTER (OUTPATIENT)
Dept: MAMMOGRAPHY | Age: 43
End: 2023-02-02
Attending: OBSTETRICS & GYNECOLOGY
Payer: MEDICAID

## 2023-02-02 DIAGNOSIS — N64.4 MASTALGIA: ICD-10-CM

## 2023-02-02 DIAGNOSIS — N64.52 NIPPLE DISCHARGE: ICD-10-CM

## 2023-02-02 PROCEDURE — 77062 BREAST TOMOSYNTHESIS BI: CPT

## 2023-02-02 PROCEDURE — 76642 ULTRASOUND BREAST LIMITED: CPT

## 2023-03-20 ENCOUNTER — NURSE TRIAGE (OUTPATIENT)
Dept: OTHER | Facility: CLINIC | Age: 43
End: 2023-03-20

## 2023-03-20 NOTE — TELEPHONE ENCOUNTER
Call transferred for fatigue Mom states no new or worsening symptoms, daughter not there at time of call.  Missed appt 3/13/23    Soft transfer to [facility] for scheduling      Reason for Disposition   Caller has already spoken with the PCP (or office), and has no further questions    Protocols used: No Contact or Duplicate Contact Call-ADULT-[place]

## 2023-03-23 ENCOUNTER — OFFICE VISIT (OUTPATIENT)
Dept: BEHAVIORAL/MENTAL HEALTH CLINIC | Age: 43
End: 2023-03-23

## 2023-03-23 VITALS
HEART RATE: 65 BPM | DIASTOLIC BLOOD PRESSURE: 67 MMHG | SYSTOLIC BLOOD PRESSURE: 110 MMHG | TEMPERATURE: 98.2 F | BODY MASS INDEX: 41.96 KG/M2 | OXYGEN SATURATION: 98 % | WEIGHT: 228 LBS | RESPIRATION RATE: 16 BRPM | HEIGHT: 62 IN

## 2023-03-23 DIAGNOSIS — F41.9 ANXIETY: ICD-10-CM

## 2023-03-23 DIAGNOSIS — F31.62 BIPOLAR DISORDER, CURRENT EPISODE MIXED, MODERATE (HCC): Primary | ICD-10-CM

## 2023-03-23 RX ORDER — ARIPIPRAZOLE 2 MG/1
2 TABLET ORAL DAILY
Qty: 30 TABLET | Refills: 2 | Status: SHIPPED | OUTPATIENT
Start: 2023-03-23

## 2023-03-23 RX ORDER — SERTRALINE HYDROCHLORIDE 100 MG/1
100 TABLET, FILM COATED ORAL DAILY
Qty: 30 TABLET | Refills: 2 | Status: SHIPPED | OUTPATIENT
Start: 2023-03-23

## 2023-03-23 RX ORDER — BUPROPION HYDROCHLORIDE 150 MG/1
150 TABLET ORAL
Qty: 30 TABLET | Refills: 1 | Status: SHIPPED | OUTPATIENT
Start: 2023-03-23

## 2023-03-23 NOTE — PROGRESS NOTES
Chief Complaint   Patient presents with    Medication Management    Visit Vitals  /67 (BP 1 Location: Right upper arm, BP Patient Position: Sitting, BP Cuff Size: Large adult)   Pulse 65   Temp 98.2 °F (36.8 °C) (Oral)   Resp 16   Ht 5' 2\" (1.575 m)   Wt 228 lb (103.4 kg)   SpO2 98%   BMI 41.70 kg/m²     Prior to Admission medications    Medication Sig Start Date End Date Taking? Authorizing Provider   hydrOXYzine pamoate (VISTARIL) 25 mg capsule Take 1 Capsule by mouth three (3) times daily as needed for Sleep or Anxiety. 11/29/22   Luisito Saeed, NP   sertraline (ZOLOFT) 100 mg tablet Take 1 Tablet by mouth daily. Indications: anxiousness associated with depression 11/29/22   Katarina Saeed NP   ARIPiprazole (ABILIFY) 2 mg tablet Take 1 Tablet by mouth daily. 11/29/22   Mitch Mazariegos NP     3 most recent PHQ Screens 3/23/2023   Little interest or pleasure in doing things Nearly every day   Feeling down, depressed, irritable, or hopeless Nearly every day   Total Score PHQ 2 6   Trouble falling or staying asleep, or sleeping too much Nearly every day   Feeling tired or having little energy Nearly every day   Poor appetite, weight loss, or overeating More than half the days   Feeling bad about yourself - or that you are a failure or have let yourself or your family down Nearly every day   Trouble concentrating on things such as school, work, reading, or watching TV Nearly every day   Moving or speaking so slowly that other people could have noticed; or the opposite being so fidgety that others notice Several days   Thoughts of being better off dead, or hurting yourself in some way Several days   PHQ 9 Score 22   How difficult have these problems made it for you to do your work, take care of your home and get along with others Extremely difficult     1. Have you been to the ER, urgent care clinic since your last visit? Hospitalized since your last visit? No    2.  Have you seen or consulted any other health care providers outside of the [address] [place] [address] since your last visit? Include any pap smears or colon screening.  No

## 2023-03-23 NOTE — PROGRESS NOTES
CHIEF COMPLAINT:  Ene Vergara is a 43 y.o. female and was seen today for follow-up of psychiatric condition and psychotropic medication management. HPI:    Peyton reports the following psychiatric symptoms by hx: depression, agitation, anxiety, and MACK . Overall symptoms have been present for years. Currently symptoms are of moderate/high severity. The symptoms occur daily. She reports hypersomnia, depression exacerbation, and lack of energy. Pt reports medications are beneficial. Met with pt for appt today to review current treatment plan. FAMILY/SOCIAL HX: psychosocial stressors, custody issues      REVIEW OF SYSTEMS:  Psychiatric symptoms being monitored for:  depression, BD, MACK   Appetite:increased   Sleep: decreased more than normal   Neuro: denies        Visit Vitals  /67 (BP 1 Location: Right upper arm, BP Patient Position: Sitting, BP Cuff Size: Large adult)   Pulse 65   Temp 98.2 °F (36.8 °C) (Oral)   Resp 16   Ht 5' 2\" (1.575 m)   Wt 103.4 kg (228 lb)   SpO2 98%   BMI 41.70 kg/m²       Side Effects:  none    MENTAL STATUS EXAM:   Sensorium  oriented to time, place and person   Relations cooperative   Appearance:  age appropriate and within normal Limits   Motor Behavior:  within normal limits   Speech:  normal pitch and normal volume   Thought Process: within normal limits   Thought Content free of delusions and free of hallucinations   Suicidal ideations none   Homicidal ideations none   Mood:  irritable   Affect:  normal   Memory recent  adequate   Memory remote:  adequate   Concentration:  impaired   Abstraction:  abstract   Insight:  good   Reliability good   Judgment:  good     MEDICAL DECISION MAKING:  Problems addressed today:    ICD-10-CM ICD-9-CM    1. Bipolar disorder, current episode mixed, moderate ([facility])  F31.62 296.62 buPROPion XL (WELLBUTRIN XL) 150 mg tablet      ARIPiprazole (ABILIFY) 2 mg tablet      2.  Anxiety  F41.9 300.00 sertraline (ZOLOFT) 100 mg tablet Assessment:   Peyton is responding to treatment. Symptoms are exacerbated. She reports not being adherent with medication. She has stopped taking them consistently. Educated patient on the importance of adherence. Information provided for patient for substance abuse treatment support. She has been sober for about 4 weeks. Discussed current medications and dosages. Started Wellbutrin 150 mg risks vs benefits. Reviewed treatment goals and target symptoms to monitor for. Plan:   1. Current Outpatient Medications   Medication Sig Dispense Refill    buPROPion XL (WELLBUTRIN XL) 150 mg tablet Take 1 Tablet by mouth every morning. 30 Tablet 1    sertraline (ZOLOFT) 100 mg tablet Take 1 Tablet by mouth daily. Indications: anxiousness associated with depression 30 Tablet 2    ARIPiprazole (ABILIFY) 2 mg tablet Take 1 Tablet by mouth daily. 30 Tablet 2    hydrOXYzine pamoate (VISTARIL) 25 mg capsule Take 1 Capsule by mouth three (3) times daily as needed for Sleep or Anxiety. 90 Capsule 1          medication changes made today: Start Wellbutrin 150 mg     2. Counseling and coordination of care including instructions for treatment, risks/benefits, risk factor reduction and patient/family education. She agrees with the plan. Patient instructed to call with any side effects, questions or issues. 3.    Follow-up and Dispositions    Return in about 9 weeks (around 5/25/2023) for medication management.            3/23/2023  Hunter Ruiz NP

## 2023-06-27 ENCOUNTER — OFFICE VISIT (OUTPATIENT)
Age: 43
End: 2023-06-27
Payer: MEDICAID

## 2023-06-27 VITALS
DIASTOLIC BLOOD PRESSURE: 72 MMHG | OXYGEN SATURATION: 100 % | HEIGHT: 62 IN | BODY MASS INDEX: 41.96 KG/M2 | HEART RATE: 77 BPM | WEIGHT: 228 LBS | RESPIRATION RATE: 16 BRPM | SYSTOLIC BLOOD PRESSURE: 115 MMHG

## 2023-06-27 DIAGNOSIS — F31.62 BIPOLAR DISORDER, CURRENT EPISODE MIXED, MODERATE (HCC): Primary | ICD-10-CM

## 2023-06-27 DIAGNOSIS — F41.9 ANXIETY DISORDER, UNSPECIFIED TYPE: ICD-10-CM

## 2023-06-27 PROCEDURE — 99214 OFFICE O/P EST MOD 30 MIN: CPT | Performed by: NURSE PRACTITIONER

## 2023-06-27 RX ORDER — HYDROXYZINE PAMOATE 25 MG/1
25 CAPSULE ORAL 3 TIMES DAILY PRN
Qty: 90 CAPSULE | Refills: 5 | Status: SHIPPED | OUTPATIENT
Start: 2023-06-27

## 2023-06-27 RX ORDER — ARIPIPRAZOLE 2 MG/1
2 TABLET ORAL DAILY
Qty: 30 TABLET | Refills: 5 | Status: SHIPPED | OUTPATIENT
Start: 2023-06-27

## 2023-06-27 RX ORDER — SERTRALINE HYDROCHLORIDE 100 MG/1
100 TABLET, FILM COATED ORAL DAILY
Qty: 30 TABLET | Refills: 5 | Status: SHIPPED | OUTPATIENT
Start: 2023-06-27

## 2023-06-27 ASSESSMENT — PATIENT HEALTH QUESTIONNAIRE - PHQ9
7. TROUBLE CONCENTRATING ON THINGS, SUCH AS READING THE NEWSPAPER OR WATCHING TELEVISION: 2
10. IF YOU CHECKED OFF ANY PROBLEMS, HOW DIFFICULT HAVE THESE PROBLEMS MADE IT FOR YOU TO DO YOUR WORK, TAKE CARE OF THINGS AT HOME, OR GET ALONG WITH OTHER PEOPLE: 3
SUM OF ALL RESPONSES TO PHQ9 QUESTIONS 1 & 2: 6
4. FEELING TIRED OR HAVING LITTLE ENERGY: 3
SUM OF ALL RESPONSES TO PHQ QUESTIONS 1-9: 22
2. FEELING DOWN, DEPRESSED OR HOPELESS: 3
SUM OF ALL RESPONSES TO PHQ QUESTIONS 1-9: 22
9. THOUGHTS THAT YOU WOULD BE BETTER OFF DEAD, OR OF HURTING YOURSELF: 1
3. TROUBLE FALLING OR STAYING ASLEEP: 3
8. MOVING OR SPEAKING SO SLOWLY THAT OTHER PEOPLE COULD HAVE NOTICED. OR THE OPPOSITE, BEING SO FIGETY OR RESTLESS THAT YOU HAVE BEEN MOVING AROUND A LOT MORE THAN USUAL: 2
SUM OF ALL RESPONSES TO PHQ QUESTIONS 1-9: 22
5. POOR APPETITE OR OVEREATING: 2
1. LITTLE INTEREST OR PLEASURE IN DOING THINGS: 3
SUM OF ALL RESPONSES TO PHQ QUESTIONS 1-9: 21
6. FEELING BAD ABOUT YOURSELF - OR THAT YOU ARE A FAILURE OR HAVE LET YOURSELF OR YOUR FAMILY DOWN: 3

## 2023-08-22 ENCOUNTER — OFFICE VISIT (OUTPATIENT)
Age: 43
End: 2023-08-22
Payer: MEDICAID

## 2023-08-22 VITALS
DIASTOLIC BLOOD PRESSURE: 76 MMHG | HEART RATE: 60 BPM | RESPIRATION RATE: 17 BRPM | TEMPERATURE: 98.6 F | OXYGEN SATURATION: 98 % | SYSTOLIC BLOOD PRESSURE: 120 MMHG | BODY MASS INDEX: 40.67 KG/M2 | HEIGHT: 64 IN | WEIGHT: 238.2 LBS

## 2023-08-22 DIAGNOSIS — F41.9 ANXIETY DISORDER, UNSPECIFIED TYPE: ICD-10-CM

## 2023-08-22 DIAGNOSIS — F11.20 UNCOMPLICATED OPIOID DEPENDENCE (HCC): ICD-10-CM

## 2023-08-22 DIAGNOSIS — Z72.0 TOBACCO ABUSE DISORDER: ICD-10-CM

## 2023-08-22 DIAGNOSIS — F14.10 COCAINE ABUSE (HCC): ICD-10-CM

## 2023-08-22 DIAGNOSIS — F31.62 BIPOLAR DISORDER, CURRENT EPISODE MIXED, MODERATE (HCC): Primary | ICD-10-CM

## 2023-08-22 PROCEDURE — 99214 OFFICE O/P EST MOD 30 MIN: CPT | Performed by: NURSE PRACTITIONER

## 2023-08-22 RX ORDER — BUPRENORPHINE AND NALOXONE 8; 2 MG/1; MG/1
1 FILM, SOLUBLE BUCCAL; SUBLINGUAL DAILY
COMMUNITY

## 2023-08-22 ASSESSMENT — PATIENT HEALTH QUESTIONNAIRE - PHQ9
7. TROUBLE CONCENTRATING ON THINGS, SUCH AS READING THE NEWSPAPER OR WATCHING TELEVISION: 3
SUM OF ALL RESPONSES TO PHQ QUESTIONS 1-9: 23
SUM OF ALL RESPONSES TO PHQ QUESTIONS 1-9: 23
6. FEELING BAD ABOUT YOURSELF - OR THAT YOU ARE A FAILURE OR HAVE LET YOURSELF OR YOUR FAMILY DOWN: 3
3. TROUBLE FALLING OR STAYING ASLEEP: 3
1. LITTLE INTEREST OR PLEASURE IN DOING THINGS: 3
10. IF YOU CHECKED OFF ANY PROBLEMS, HOW DIFFICULT HAVE THESE PROBLEMS MADE IT FOR YOU TO DO YOUR WORK, TAKE CARE OF THINGS AT HOME, OR GET ALONG WITH OTHER PEOPLE: 3
4. FEELING TIRED OR HAVING LITTLE ENERGY: 3
5. POOR APPETITE OR OVEREATING: 3
SUM OF ALL RESPONSES TO PHQ9 QUESTIONS 1 & 2: 6
SUM OF ALL RESPONSES TO PHQ QUESTIONS 1-9: 23
9. THOUGHTS THAT YOU WOULD BE BETTER OFF DEAD, OR OF HURTING YOURSELF: 1
8. MOVING OR SPEAKING SO SLOWLY THAT OTHER PEOPLE COULD HAVE NOTICED. OR THE OPPOSITE, BEING SO FIGETY OR RESTLESS THAT YOU HAVE BEEN MOVING AROUND A LOT MORE THAN USUAL: 1
SUM OF ALL RESPONSES TO PHQ QUESTIONS 1-9: 22
2. FEELING DOWN, DEPRESSED OR HOPELESS: 3

## 2023-08-22 ASSESSMENT — ANXIETY QUESTIONNAIRES
5. BEING SO RESTLESS THAT IT IS HARD TO SIT STILL: 2
7. FEELING AFRAID AS IF SOMETHING AWFUL MIGHT HAPPEN: 3
6. BECOMING EASILY ANNOYED OR IRRITABLE: 2
2. NOT BEING ABLE TO STOP OR CONTROL WORRYING: 3
GAD7 TOTAL SCORE: 19
1. FEELING NERVOUS, ANXIOUS, OR ON EDGE: 3
3. WORRYING TOO MUCH ABOUT DIFFERENT THINGS: 3
IF YOU CHECKED OFF ANY PROBLEMS ON THIS QUESTIONNAIRE, HOW DIFFICULT HAVE THESE PROBLEMS MADE IT FOR YOU TO DO YOUR WORK, TAKE CARE OF THINGS AT HOME, OR GET ALONG WITH OTHER PEOPLE: EXTREMELY DIFFICULT
4. TROUBLE RELAXING: 3

## 2023-08-22 NOTE — PROGRESS NOTES
PM)
with pt for appt today to review current treatment plan. Assessment per ILYA Barber on 6/27/23:   Kalani Dumont is responding to treatment. Symptoms are exacerbated, but patient reports she feels better overall. She relapsed after the death of her kids father on June 10th. Last use was 6 days ago. Discussed transition of care. Discussed current medications and dosages. No changes made today. Reviewed treatment goals and target symptoms to monitor for. HPI per ILYA Barber on 10/12/22 for Initial Eval:  Arcelia reports the following psychiatric symptoms:  depression, anxiety, and PTSD, social anxiety, Bipolar Disorder  and substance use disorder. The symptoms have been present for years and are of moderate severity. The symptoms occur constantly. Associated symptoms include  anxiety, avoidance of crowds, concern about health problems, depression worse, feeling depressed, feeling suicidal, poor concentration, relationship difficulties, tearfulness, and trauma recollections. She reports she has been struggling with depression since she was a young kid. She has been struggling with substance use since she was 22. Her children have bee taken away from her and her sister had custody of them. She feels like she's lost everything. She is currently using cocaine daily. She denies seeing a therapist she was seeing Dr. Pelon Maher at [facility]. She was hospitalized for 5 days last year for what seems like a manic episode and pychosis. Patient doesn't have records. She states she has tried everything and is currently taking Zoloft. She denies current landy or psychosis. Denies SI. There are currently no precipitating or alleviating factors, but symptoms worsens by traumatic thoughts. Assessment per ILYA Barber on 10/12/23 for Initial Eval:  Sisi Quiroz is a 43 y.o. female and presents to outpatient face to face appt to establish care with this provider.  Patient is tearful, cooperative, alert and oriented, and has good

## 2023-08-23 DIAGNOSIS — F31.62 BIPOLAR DISORDER, CURRENT EPISODE MIXED, MODERATE (HCC): ICD-10-CM

## 2023-08-23 PROBLEM — F14.10 COCAINE ABUSE (HCC): Status: ACTIVE | Noted: 2023-08-23

## 2023-08-23 PROBLEM — Z72.0 TOBACCO ABUSE DISORDER: Status: ACTIVE | Noted: 2023-08-23

## 2023-08-23 PROBLEM — F11.20 UNCOMPLICATED OPIOID DEPENDENCE (HCC): Status: ACTIVE | Noted: 2023-08-23

## 2023-08-23 PROBLEM — F41.9 ANXIETY DISORDER: Status: ACTIVE | Noted: 2022-10-12

## 2023-08-23 RX ORDER — NICOTINE 21 MG/24HR
1 PATCH, TRANSDERMAL 24 HOURS TRANSDERMAL DAILY
Qty: 42 PATCH | Refills: 0 | Status: SHIPPED | OUTPATIENT
Start: 2023-08-23 | End: 2023-10-04

## 2023-08-23 RX ORDER — ARIPIPRAZOLE 5 MG/1
5 TABLET ORAL DAILY
Qty: 30 TABLET | Refills: 1 | Status: SHIPPED | OUTPATIENT
Start: 2023-08-23 | End: 2023-10-22

## 2023-08-23 RX ORDER — SERTRALINE HYDROCHLORIDE 100 MG/1
TABLET, FILM COATED ORAL
Qty: 45 TABLET | Refills: 1 | Status: SHIPPED | OUTPATIENT
Start: 2023-08-23 | End: 2023-10-21

## 2023-10-06 ENCOUNTER — TELEPHONE (OUTPATIENT)
Age: 43
End: 2023-10-06

## 2024-03-26 ENCOUNTER — OFFICE VISIT (OUTPATIENT)
Age: 44
End: 2024-03-26
Payer: MEDICAID

## 2024-03-26 VITALS
RESPIRATION RATE: 17 BRPM | BODY MASS INDEX: 44.67 KG/M2 | TEMPERATURE: 98.2 F | HEART RATE: 79 BPM | HEIGHT: 61 IN | SYSTOLIC BLOOD PRESSURE: 122 MMHG | OXYGEN SATURATION: 99 % | WEIGHT: 236.6 LBS | DIASTOLIC BLOOD PRESSURE: 70 MMHG

## 2024-03-26 DIAGNOSIS — F11.20 UNCOMPLICATED OPIOID DEPENDENCE (HCC): ICD-10-CM

## 2024-03-26 DIAGNOSIS — F14.10 COCAINE ABUSE (HCC): ICD-10-CM

## 2024-03-26 DIAGNOSIS — F19.90 OTHER PSYCHOACTIVE SUBSTANCE USE, UNSPECIFIED, UNCOMPLICATED: ICD-10-CM

## 2024-03-26 DIAGNOSIS — F41.9 ANXIETY DISORDER, UNSPECIFIED TYPE: ICD-10-CM

## 2024-03-26 DIAGNOSIS — F31.31 BIPOLAR AFFECTIVE DISORDER, CURRENTLY DEPRESSED, MILD (HCC): Primary | ICD-10-CM

## 2024-03-26 DIAGNOSIS — Z72.0 TOBACCO ABUSE DISORDER: ICD-10-CM

## 2024-03-26 PROCEDURE — 99213 OFFICE O/P EST LOW 20 MIN: CPT | Performed by: NURSE PRACTITIONER

## 2024-03-26 ASSESSMENT — PATIENT HEALTH QUESTIONNAIRE - PHQ9
SUM OF ALL RESPONSES TO PHQ9 QUESTIONS 1 & 2: 6
9. THOUGHTS THAT YOU WOULD BE BETTER OFF DEAD, OR OF HURTING YOURSELF: NEARLY EVERY DAY
7. TROUBLE CONCENTRATING ON THINGS, SUCH AS READING THE NEWSPAPER OR WATCHING TELEVISION: NEARLY EVERY DAY
2. FEELING DOWN, DEPRESSED OR HOPELESS: NEARLY EVERY DAY
SUM OF ALL RESPONSES TO PHQ QUESTIONS 1-9: 24
SUM OF ALL RESPONSES TO PHQ QUESTIONS 1-9: 27
6. FEELING BAD ABOUT YOURSELF - OR THAT YOU ARE A FAILURE OR HAVE LET YOURSELF OR YOUR FAMILY DOWN: NEARLY EVERY DAY
4. FEELING TIRED OR HAVING LITTLE ENERGY: NEARLY EVERY DAY
3. TROUBLE FALLING OR STAYING ASLEEP: NEARLY EVERY DAY
SUM OF ALL RESPONSES TO PHQ QUESTIONS 1-9: 27
5. POOR APPETITE OR OVEREATING: NEARLY EVERY DAY
10. IF YOU CHECKED OFF ANY PROBLEMS, HOW DIFFICULT HAVE THESE PROBLEMS MADE IT FOR YOU TO DO YOUR WORK, TAKE CARE OF THINGS AT HOME, OR GET ALONG WITH OTHER PEOPLE: EXTREMELY DIFFICULT
1. LITTLE INTEREST OR PLEASURE IN DOING THINGS: NEARLY EVERY DAY
8. MOVING OR SPEAKING SO SLOWLY THAT OTHER PEOPLE COULD HAVE NOTICED. OR THE OPPOSITE, BEING SO FIGETY OR RESTLESS THAT YOU HAVE BEEN MOVING AROUND A LOT MORE THAN USUAL: NEARLY EVERY DAY
SUM OF ALL RESPONSES TO PHQ QUESTIONS 1-9: 27

## 2024-03-26 ASSESSMENT — ANXIETY QUESTIONNAIRES
IF YOU CHECKED OFF ANY PROBLEMS ON THIS QUESTIONNAIRE, HOW DIFFICULT HAVE THESE PROBLEMS MADE IT FOR YOU TO DO YOUR WORK, TAKE CARE OF THINGS AT HOME, OR GET ALONG WITH OTHER PEOPLE: EXTREMELY DIFFICULT
6. BECOMING EASILY ANNOYED OR IRRITABLE: NEARLY EVERY DAY
5. BEING SO RESTLESS THAT IT IS HARD TO SIT STILL: NEARLY EVERY DAY
2. NOT BEING ABLE TO STOP OR CONTROL WORRYING: NEARLY EVERY DAY
7. FEELING AFRAID AS IF SOMETHING AWFUL MIGHT HAPPEN: NEARLY EVERY DAY
3. WORRYING TOO MUCH ABOUT DIFFERENT THINGS: NEARLY EVERY DAY
GAD7 TOTAL SCORE: 21
4. TROUBLE RELAXING: NEARLY EVERY DAY
1. FEELING NERVOUS, ANXIOUS, OR ON EDGE: NEARLY EVERY DAY

## 2024-03-26 NOTE — PROGRESS NOTES
CHIEF COMPLAINT:  Arcelia Cary is a 43 y.o. female (6/10/23) and mother of 3 children, and 1 grandmother to 1 grand-daughter. She is domiciled with her mother, grand daughter and 2 children in her mother's home. Today she is being seen for a scheduled follow-up appointment to discuss mood symptom management associated with the historical diagnoses of anxiety, depression, and Bipolar disorder with psychotropic medication management. *Last in office- (8/23/23- NTP- Increase Abilify to 5 mg, Increase Zoloft to 150 mg, Start Nicotine patches6/27/23; Relapse on Crack 6/10/23; No changes to medications.         HPI:    Today, (3/26/24), Arcelia states, \"I'm alright, but I'm always tired.\" She attends appt on time and appears tired, is disheveled and obese, with poor hygiene and is malodorous. She makes poor eye contact and is fidgety. Historically reported mood symptoms include anxiety, depression, landy and hypo-landy, with insomnia, worsening depression, avoidance of crowds, isolative behaviours, self harm (cutting) and substance abuse.  These psychiatric symptoms have been present since childhood, through adolescence with increase in her early twenties then exacerbated with active opiate/heroin and crack addiction. Pt has a long hx of relapse with both opiates and crack.  Currently, Arcelia is not taking any prescribed BH meds and denies use since \"September, I think\". She has abstained from crack for 2 weeks but admits to \"buying Suboxone off the street\" as a result of \"being kicked out of my program for 3 months\". Mood is exacerbated d/t \"mostly guilt and not doing what I should be\". Pt identifies goal of recovery and is wanting to simplify \"doctor's appointments\". Discussed at length options to her that are available to her in the community. After lengthy discussion, pt is wishing to establish care that \"can do both\". She denies SI, gestures, plan, or intent. She denies HI and AVH.     PHQ-9 score

## 2024-03-26 NOTE — PROGRESS NOTES
Chief Complaint   Patient presents with    Medication Management     Increased depression. Patient would like to start back on medication. Patient has been clean of crack for two weeks. \"I have been kicked out of my soboxone clinic. I have been buying it off the street\"  She states a strip can last a week. Patient states she feels motivated when she is high. Clinic attended was [facility]. Patient stated they completed the brain mapping. She would like to discuss with the provider.     Patient states she doesn't want to continue \"going down that rabbit hole.\"    Vitals:    03/26/24 1057   BP: 122/70   Site: Left Upper Arm   Position: Sitting   Cuff Size: Large Adult   Pulse: 79   Resp: 17   Temp: 98.2 °F (36.8 °C)   TempSrc: Oral   SpO2: 99%   Weight: 107.3 kg (236 lb 9.6 oz)   Height: 1.549 m (5' 1\")     Pain Score: EIGHT (shoulder, joint, hips. Possibly arthritis)    Prior to Admission medications    Medication Sig Start Date End Date Taking? Authorizing Provider   buprenorphine-naloxone (SUBOXONE) 8-2 MG FILM SL film Place 1 Film under the tongue daily.   Yes Provider, MD Mavis   hydrOXYzine pamoate (VISTARIL) 25 MG capsule Take 1 capsule by mouth 3 times daily as needed for Anxiety 6/27/23  Yes Stella Ramirez APRN - NP   ARIPiprazole (ABILIFY) 5 MG tablet Take 1 tablet by mouth daily  Patient not taking: Reported on 3/26/2024 8/23/23 10/22/23  Pretty Estrada APRN - CNP   sertraline (ZOLOFT) 100 MG tablet Take 1.5 tablets by mouth daily  Patient not taking: Reported on 3/26/2024 8/23/23 10/21/23  Pretty Estrada APRN - CNP   nicotine (NICODERM CQ) 21 MG/24HR Place 1 patch onto the skin daily  Patient not taking: Reported on 3/26/2024 8/23/23 10/4/23  Pretty Estrada APRN - CNP         3/26/2024    11:04 AM 8/22/2023     2:08 PM   GAGE-7 SCREENING   Feeling nervous, anxious, or on edge Nearly every day Nearly every day   Not being able to stop or control worrying Nearly every day Nearly every day

## 2024-08-16 ENCOUNTER — OFFICE VISIT (OUTPATIENT)
Facility: CLINIC | Age: 44
End: 2024-08-16

## 2024-08-16 VITALS
OXYGEN SATURATION: 99 % | HEIGHT: 61 IN | DIASTOLIC BLOOD PRESSURE: 96 MMHG | RESPIRATION RATE: 16 BRPM | TEMPERATURE: 97 F | HEART RATE: 65 BPM | WEIGHT: 144.4 LBS | BODY MASS INDEX: 27.26 KG/M2 | SYSTOLIC BLOOD PRESSURE: 138 MMHG

## 2024-08-16 DIAGNOSIS — Z00.01 ENCOUNTER FOR WELL ADULT EXAM WITH ABNORMAL FINDINGS: Primary | ICD-10-CM

## 2024-08-16 DIAGNOSIS — Z00.01 ENCOUNTER FOR WELL ADULT EXAM WITH ABNORMAL FINDINGS: ICD-10-CM

## 2024-08-16 DIAGNOSIS — B37.0 THRUSH, ORAL: ICD-10-CM

## 2024-08-16 DIAGNOSIS — F19.90 SUBSTANCE USE DISORDER: ICD-10-CM

## 2024-08-16 DIAGNOSIS — R35.0 URINARY FREQUENCY: ICD-10-CM

## 2024-08-16 LAB
BILIRUBIN, URINE, POC: NEGATIVE
BLOOD URINE, POC: NORMAL
GLUCOSE URINE, POC: NEGATIVE
KETONES, URINE, POC: NEGATIVE
LEUKOCYTE ESTERASE, URINE, POC: NEGATIVE
NITRITE, URINE, POC: NEGATIVE
PH, URINE, POC: 6 (ref 4.6–8)
PROTEIN,URINE, POC: 30
SPECIFIC GRAVITY, URINE, POC: 1.03 (ref 1–1.03)
URINALYSIS CLARITY, POC: CLEAR
URINALYSIS COLOR, POC: YELLOW
UROBILINOGEN, POC: NORMAL

## 2024-08-16 RX ORDER — HYDROXYZINE PAMOATE 25 MG/1
25 CAPSULE ORAL 3 TIMES DAILY PRN
Qty: 90 CAPSULE | Refills: 5 | Status: CANCELLED | OUTPATIENT
Start: 2024-08-16

## 2024-08-16 ASSESSMENT — ENCOUNTER SYMPTOMS
COUGH: 1
WHEEZING: 1

## 2024-08-16 NOTE — PROGRESS NOTES
Arcelia Cary  44 y.o. female  1980  MRN:[id number]  [facility]  Progress Note         Arcelia Cary is a 44 y.o. female for annual physical exam.    Ms. Cary struggles with substance use disorder, she is taking Suboxone (not prescribed), crack cocaine, cigarettes 1/2 pack/day. Had a Psychiatrist but says she was let go and advised to go to an outpatient addiction treatment center - [facility], but patient has not yet gone, says it is difficult to get out of bed. Is not currently on any prescribed medications.     Needs to have oral surgery for dental abscess, finished prescribed ABX, but needs to establish care with a PCP for [facility] pre-op form completion.    Health maintenance:    The following sections were reviewed & updated as appropriate: PMH, PSH, FH, and SH.    Patient Active Problem List   Diagnosis    Bipolar disorder, current episode mixed, moderate (HCC)    ADHD (attention deficit hyperactivity disorder), combined type    Substance use disorder    Anxiety disorder    Chronic low back pain    Displacement of lumbar intervertebral disc without myelopathy    Morbid obesity with BMI of 40.0-44.9, adult (HCC)    Uncomplicated opioid dependence (HCC)    Cocaine abuse (HCC)    Tobacco abuse disorder          Prior to Admission medications    Medication Sig Start Date End Date Taking? Authorizing Provider   nystatin (MYCOSTATIN) 203945 UNIT/ML suspension Take 5 mLs by mouth 4 times daily . Swish in the mouth and retain for as long as possible (several minutes) before swallowing 8/16/24  Yes Tami Stephens, APRN - CNP   buprenorphine-naloxone (SUBOXONE) 8-2 MG FILM SL film Place 1 Film under the tongue daily.   Yes Provider, MD Mavis   hydrOXYzine pamoate (VISTARIL) 25 MG capsule Take 1 capsule by mouth 3 times daily as needed for Anxiety 6/27/23  Yes Stella Ramirez, APRN - NP   ARIPiprazole (ABILIFY) 5 MG tablet Take 1 tablet by mouth

## 2024-08-16 NOTE — PROGRESS NOTES
\"Have you been to the ER, urgent care clinic since your last visit?  Hospitalized since your last visit?\"    no    “Have you seen or consulted any other health care providers outside of [facility] since your last visit?”    no     “Have you had a pap smear?”    no    No cervical cancer screening on file             Click Here for Release of Records Request

## 2024-08-17 LAB
25(OH)D3+25(OH)D2 SERPL-MCNC: 23.1 NG/ML (ref 30–100)
ALBUMIN SERPL-MCNC: 4.3 G/DL (ref 3.9–4.9)
ALP SERPL-CCNC: 79 IU/L (ref 44–121)
ALT SERPL-CCNC: 18 IU/L (ref 0–32)
AST SERPL-CCNC: 18 IU/L (ref 0–40)
BASOPHILS # BLD AUTO: 0.1 X10E3/UL (ref 0–0.2)
BASOPHILS NFR BLD AUTO: 1 %
BILIRUB SERPL-MCNC: 0.4 MG/DL (ref 0–1.2)
BUN SERPL-MCNC: 12 MG/DL (ref 6–24)
BUN/CREAT SERPL: 19 (ref 9–23)
CALCIUM SERPL-MCNC: 9.2 MG/DL (ref 8.7–10.2)
CHLORIDE SERPL-SCNC: 107 MMOL/L (ref 96–106)
CHOLEST SERPL-MCNC: 153 MG/DL (ref 100–199)
CO2 SERPL-SCNC: 19 MMOL/L (ref 20–29)
CREAT SERPL-MCNC: 0.64 MG/DL (ref 0.57–1)
EGFRCR SERPLBLD CKD-EPI 2021: 112 ML/MIN/1.73
EOSINOPHIL # BLD AUTO: 0.3 X10E3/UL (ref 0–0.4)
EOSINOPHIL NFR BLD AUTO: 3 %
ERYTHROCYTE [DISTWIDTH] IN BLOOD BY AUTOMATED COUNT: 12.6 % (ref 11.7–15.4)
FOLATE SERPL-MCNC: 8.7 NG/ML
GLOBULIN SER CALC-MCNC: 2.5 G/DL (ref 1.5–4.5)
GLUCOSE SERPL-MCNC: 86 MG/DL (ref 70–99)
HBA1C MFR BLD: 5.4 % (ref 4.8–5.6)
HBV SURFACE AG SERPL QL IA: NEGATIVE
HCT VFR BLD AUTO: 40.9 % (ref 34–46.6)
HCV IGG SERPL QL IA: NON REACTIVE
HDLC SERPL-MCNC: 46 MG/DL
HGB BLD-MCNC: 13.7 G/DL (ref 11.1–15.9)
HIV 1+2 AB+HIV1 P24 AG SERPL QL IA: NON REACTIVE
IMM GRANULOCYTES # BLD AUTO: 0.1 X10E3/UL (ref 0–0.1)
IMM GRANULOCYTES NFR BLD AUTO: 1 %
LDLC SERPL CALC-MCNC: 85 MG/DL (ref 0–99)
LYMPHOCYTES # BLD AUTO: 2.6 X10E3/UL (ref 0.7–3.1)
LYMPHOCYTES NFR BLD AUTO: 27 %
MCH RBC QN AUTO: 31.1 PG (ref 26.6–33)
MCHC RBC AUTO-ENTMCNC: 33.5 G/DL (ref 31.5–35.7)
MCV RBC AUTO: 93 FL (ref 79–97)
MONOCYTES # BLD AUTO: 0.8 X10E3/UL (ref 0.1–0.9)
MONOCYTES NFR BLD AUTO: 8 %
NEUTROPHILS # BLD AUTO: 5.9 X10E3/UL (ref 1.4–7)
NEUTROPHILS NFR BLD AUTO: 60 %
PLATELET # BLD AUTO: 371 X10E3/UL (ref 150–450)
POTASSIUM SERPL-SCNC: 4.4 MMOL/L (ref 3.5–5.2)
PROT SERPL-MCNC: 6.8 G/DL (ref 6–8.5)
RBC # BLD AUTO: 4.41 X10E6/UL (ref 3.77–5.28)
RPR SER QL: NON REACTIVE
SODIUM SERPL-SCNC: 141 MMOL/L (ref 134–144)
T3FREE SERPL-MCNC: 2.9 PG/ML (ref 2–4.4)
T4 FREE SERPL-MCNC: 1.18 NG/DL (ref 0.82–1.77)
TRIGL SERPL-MCNC: 124 MG/DL (ref 0–149)
TSH SERPL DL<=0.005 MIU/L-ACNC: 1.77 UIU/ML (ref 0.45–4.5)
VIT B12 SERPL-MCNC: 262 PG/ML (ref 232–1245)
VLDLC SERPL CALC-MCNC: 22 MG/DL (ref 5–40)
WBC # BLD AUTO: 9.7 X10E3/UL (ref 3.4–10.8)

## 2024-08-27 ENCOUNTER — OFFICE VISIT (OUTPATIENT)
Facility: CLINIC | Age: 44
End: 2024-08-27
Payer: MEDICAID

## 2024-08-27 VITALS
DIASTOLIC BLOOD PRESSURE: 87 MMHG | BODY MASS INDEX: 46.67 KG/M2 | HEIGHT: 61 IN | TEMPERATURE: 97.3 F | RESPIRATION RATE: 20 BRPM | SYSTOLIC BLOOD PRESSURE: 145 MMHG | OXYGEN SATURATION: 98 % | HEART RATE: 76 BPM | WEIGHT: 247.2 LBS

## 2024-08-27 DIAGNOSIS — Z01.818 PRE-OP EXAM: Primary | ICD-10-CM

## 2024-08-27 PROCEDURE — 99214 OFFICE O/P EST MOD 30 MIN: CPT | Performed by: NURSE PRACTITIONER

## 2024-08-27 PROCEDURE — 93000 ELECTROCARDIOGRAM COMPLETE: CPT | Performed by: NURSE PRACTITIONER

## 2024-08-27 SDOH — ECONOMIC STABILITY: FOOD INSECURITY: WITHIN THE PAST 12 MONTHS, THE FOOD YOU BOUGHT JUST DIDN'T LAST AND YOU DIDN'T HAVE MONEY TO GET MORE.: NEVER TRUE

## 2024-08-27 SDOH — ECONOMIC STABILITY: FOOD INSECURITY: WITHIN THE PAST 12 MONTHS, YOU WORRIED THAT YOUR FOOD WOULD RUN OUT BEFORE YOU GOT MONEY TO BUY MORE.: NEVER TRUE

## 2024-08-27 SDOH — ECONOMIC STABILITY: INCOME INSECURITY: HOW HARD IS IT FOR YOU TO PAY FOR THE VERY BASICS LIKE FOOD, HOUSING, MEDICAL CARE, AND HEATING?: NOT HARD AT ALL

## 2024-08-27 NOTE — PROGRESS NOTES
Arcelia Cary  44 y.o. female  1980  MRN:[id number]  [facility]  Progress Note     Encounter Date: 2024      Preoperative Evaluation    Date of Exam: 2024    Arcelia Cary is a 44 y.o. female (:1980) who presents for preoperative evaluation.     Procedure/Surgery: Tooth extraction due to dental abscess and dental caries     Date of Procedure/Surgery: TBD    Surgeon: [facility]    Hospital/Surgical Facility: [facility]    Primary Physician: Cecilio Resendez MD    Latex Allergy: No    Recent use of: ASA and NSAIDs    Tetanus up to date: tetanus status unknown to the patient    Anesthesia Complications: no reported complications    History of abnormal bleeding : None    History of Blood Transfusions: no    Health Care Directive or Living Will: no    Functional Capacity: Patient is able to climb a flight of stairs without chest pain or severe SOB.      Allergies- reviewed:   Allergies   Allergen Reactions    Ketorolac Tromethamine Other (See Comments)     Other reaction(s): Other           Medications- reviewed:   Current Outpatient Medications   Medication Sig    nystatin (MYCOSTATIN) 973563 UNIT/ML suspension Take 5 mLs by mouth 4 times daily . Swish in the mouth and retain for as long as possible (several minutes) before swallowing    ARIPiprazole (ABILIFY) 5 MG tablet Take 1 tablet by mouth daily (Patient not taking: Reported on 3/26/2024)    sertraline (ZOLOFT) 100 MG tablet Take 1.5 tablets by mouth daily (Patient not taking: Reported on 3/26/2024)    nicotine (NICODERM CQ) 21 MG/24HR Place 1 patch onto the skin daily (Patient not taking: Reported on 3/26/2024)    buprenorphine-naloxone (SUBOXONE) 8-2 MG FILM SL film Place 1 Film under the tongue daily.    hydrOXYzine pamoate (VISTARIL) 25 MG capsule Take 1 capsule by mouth 3 times daily as needed for Anxiety     No current facility-administered medications for this visit.         Past  Medical History- reviewed:  Past Medical History:   Diagnosis Date    Anxiety     Arrhythmia     Cervical high risk HPV (human papillomavirus) test positive 2014    Depression     Ectopic pregnancy 10/2012    Endometriosis     Fibromyalgia     Genital herpes     Headache     Hypertension     Nipple discharge     Bilateral approx 1yr. seen on nightgown crusty    PID (acute pelvic inflammatory disease)          Past Surgical History- reviewed:   Past Surgical History:   Procedure Laterality Date    CHOLECYSTECTOMY, LAPAROSCOPIC  10/2009    GYN      PELVIC LAPAROSCOPY  10/2012    Ovarian Cyst + ablation endometriosis    SALPINGECTOMY Left 10/2012    ectopic    TUBAL LIGATION Bilateral 10/2015    scope         Social History- reviewed:  Social History     Socioeconomic History    Marital status:      Spouse name: Not on file    Number of children: Not on file    Years of education: Not on file    Highest education level: Not on file   Occupational History    Not on file   Tobacco Use    Smoking status: Every Day     Current packs/day: 1.00     Types: Cigarettes    Smokeless tobacco: Never   Vaping Use    Vaping status: Never Used   Substance and Sexual Activity    Alcohol use: No    Drug use: Yes     Frequency: 7.0 times per week     Types: Cocaine, Opiates , Crack Cocaine, Suboxone    Sexual activity: Not on file   Other Topics Concern    Not on file   Social History Narrative    Not on file     Social Determinants of Health     Financial Resource Strain: Low Risk  (8/27/2024)    Overall Financial Resource Strain (CARDIA)     Difficulty of Paying Living Expenses: Not hard at all   Food Insecurity: No Food Insecurity (8/27/2024)    Hunger Vital Sign     Worried About Running Out of Food in the Last Year: Never true     Ran Out of Food in the Last Year: Never true   Transportation Needs: Unknown (8/27/2024)    PRAPARE - Transportation     Lack of Transportation (Medical): Not on file     Lack of Transportation

## 2024-08-27 NOTE — PROGRESS NOTES
dentified pt with two pt identifiers(name and ).    Chief Complaint   Patient presents with    Pre-op Exam     Patient here for a Pre Op Exam for tooth extractions        Health Maintenance Due   Topic    Pneumococcal 0-64 years Vaccine (1 of 2 - PCV)    Varicella vaccine (1 of 2 - 13+ 2-dose series)    Hepatitis B vaccine (1 of 3 - 19+ 3-dose series)    DTaP/Tdap/Td vaccine (1 - Tdap)    Cervical cancer screen     COVID-19 Vaccine (2023- season)    Flu vaccine (1)       Wt Readings from Last 3 Encounters:   24 112.1 kg (247 lb 3.2 oz)   24 65.5 kg (144 lb 6.4 oz)   01/10/23 102.8 kg (226 lb 9.6 oz)     Temp Readings from Last 3 Encounters:   24 97 °F (36.1 °C) (Temporal)     BP Readings from Last 3 Encounters:   24 (!) 138/96   01/10/23 128/80     Pulse Readings from Last 3 Encounters:   24 65           Coordination of Care Questionnaire:  :   1. \"Have you been to the ER, urgent care clinic since your last visit?  Hospitalized since your last visit?\" no    2. \"Have you seen or consulted any other health care providers outside of the [facility] since your last visit?\" no     3. For patients aged 45-75: Has the patient had a colonoscopy / FIT/ Cologuard? no      If the patient is female:    4. For patients aged 40-74: Has the patient had a mammogram within the past 2 years? no      5. For patients aged 21-65: Has the patient had a pap smear? no     3) Do you have an Advance Directive on file? no  Are you interested in receiving information about Advance Directives? no    Patient is accompanied by Daughter I have received verbal consent from Arcelia Cary to discuss any/all medical information while they are present in the room.